# Patient Record
Sex: MALE | Race: WHITE
[De-identification: names, ages, dates, MRNs, and addresses within clinical notes are randomized per-mention and may not be internally consistent; named-entity substitution may affect disease eponyms.]

---

## 2022-12-03 ENCOUNTER — HOSPITAL ENCOUNTER (EMERGENCY)
Dept: HOSPITAL 47 - EC | Age: 37
LOS: 1 days | Discharge: HOME | End: 2022-12-04
Payer: COMMERCIAL

## 2022-12-03 VITALS — TEMPERATURE: 98.7 F

## 2022-12-03 VITALS — RESPIRATION RATE: 18 BRPM

## 2022-12-03 DIAGNOSIS — F41.9: ICD-10-CM

## 2022-12-03 DIAGNOSIS — F17.210: ICD-10-CM

## 2022-12-03 DIAGNOSIS — R06.02: Primary | ICD-10-CM

## 2022-12-03 LAB
ALBUMIN SERPL-MCNC: 5 G/DL (ref 3.5–5)
ALP SERPL-CCNC: 102 U/L (ref 38–126)
ALT SERPL-CCNC: 29 U/L (ref 4–49)
ANION GAP SERPL CALC-SCNC: 13 MMOL/L
APTT BLD: 25.2 SEC (ref 22–30)
AST SERPL-CCNC: 32 U/L (ref 17–59)
BASOPHILS # BLD AUTO: 0 K/UL (ref 0–0.2)
BASOPHILS NFR BLD AUTO: 0 %
BUN SERPL-SCNC: 16 MG/DL (ref 9–20)
CALCIUM SPEC-MCNC: 9.5 MG/DL (ref 8.4–10.2)
CHLORIDE SERPL-SCNC: 103 MMOL/L (ref 98–107)
CO2 SERPL-SCNC: 24 MMOL/L (ref 22–30)
EOSINOPHIL # BLD AUTO: 0.1 K/UL (ref 0–0.7)
EOSINOPHIL NFR BLD AUTO: 1 %
ERYTHROCYTE [DISTWIDTH] IN BLOOD BY AUTOMATED COUNT: 5.51 M/UL (ref 4.3–5.9)
ERYTHROCYTE [DISTWIDTH] IN BLOOD: 13.6 % (ref 11.5–15.5)
GLUCOSE SERPL-MCNC: 101 MG/DL (ref 74–99)
HCT VFR BLD AUTO: 44.9 % (ref 39–53)
HGB BLD-MCNC: 14.8 GM/DL (ref 13–17.5)
INR PPP: 0.9 (ref ?–1.2)
LYMPHOCYTES # SPEC AUTO: 1.3 K/UL (ref 1–4.8)
LYMPHOCYTES NFR SPEC AUTO: 10 %
MAGNESIUM SPEC-SCNC: 1.7 MG/DL (ref 1.6–2.3)
MCH RBC QN AUTO: 26.8 PG (ref 25–35)
MCHC RBC AUTO-ENTMCNC: 32.9 G/DL (ref 31–37)
MCV RBC AUTO: 81.5 FL (ref 80–100)
MONOCYTES # BLD AUTO: 0.6 K/UL (ref 0–1)
MONOCYTES NFR BLD AUTO: 5 %
NEUTROPHILS # BLD AUTO: 10.7 K/UL (ref 1.3–7.7)
NEUTROPHILS NFR BLD AUTO: 83 %
PLATELET # BLD AUTO: 205 K/UL (ref 150–450)
POTASSIUM SERPL-SCNC: 3.9 MMOL/L (ref 3.5–5.1)
PROT SERPL-MCNC: 7.7 G/DL (ref 6.3–8.2)
PT BLD: 10.1 SEC (ref 9–12)
SODIUM SERPL-SCNC: 140 MMOL/L (ref 137–145)
WBC # BLD AUTO: 12.8 K/UL (ref 3.8–10.6)

## 2022-12-03 PROCEDURE — 84443 ASSAY THYROID STIM HORMONE: CPT

## 2022-12-03 PROCEDURE — 71046 X-RAY EXAM CHEST 2 VIEWS: CPT

## 2022-12-03 PROCEDURE — 85610 PROTHROMBIN TIME: CPT

## 2022-12-03 PROCEDURE — 96361 HYDRATE IV INFUSION ADD-ON: CPT

## 2022-12-03 PROCEDURE — 96374 THER/PROPH/DIAG INJ IV PUSH: CPT

## 2022-12-03 PROCEDURE — 85379 FIBRIN DEGRADATION QUANT: CPT

## 2022-12-03 PROCEDURE — 84484 ASSAY OF TROPONIN QUANT: CPT

## 2022-12-03 PROCEDURE — 80053 COMPREHEN METABOLIC PANEL: CPT

## 2022-12-03 PROCEDURE — 83735 ASSAY OF MAGNESIUM: CPT

## 2022-12-03 PROCEDURE — 85025 COMPLETE CBC W/AUTO DIFF WBC: CPT

## 2022-12-03 PROCEDURE — 36415 COLL VENOUS BLD VENIPUNCTURE: CPT

## 2022-12-03 PROCEDURE — 93005 ELECTROCARDIOGRAM TRACING: CPT

## 2022-12-03 PROCEDURE — 99285 EMERGENCY DEPT VISIT HI MDM: CPT

## 2022-12-03 PROCEDURE — 83605 ASSAY OF LACTIC ACID: CPT

## 2022-12-03 PROCEDURE — 85730 THROMBOPLASTIN TIME PARTIAL: CPT

## 2022-12-03 NOTE — XR
EXAMINATION TYPE: XR chest 2V

 

DATE OF EXAM: 12/3/2022

 

COMPARISON: NONE

 

HISTORY: Short of breath

 

TECHNIQUE:

 

FINDINGS: Heart and mediastinum are normal. Lungs are clear. Diaphragm is normal. Bony thorax is norm
al.

 

IMPRESSION: Normal chest.

## 2022-12-03 NOTE — ED
SOB HPI





<Braxton Cid - Last Filed: 12/03/22 22:17>





- General


Source: patient, family, RN notes reviewed, old records reviewed


Mode of arrival: ambulatory


Limitations: no limitations





- History of Present Illness


MD Complaint: shortness of breath


-: hour(s)


Severity scale (1-10): 0


Consistency: constant


Improves With: nothing


Worsens With: nothing


Associated Symptoms: other (Shortness of breath, dizziness)





<Brady Mayes - Last Filed: 12/04/22 02:12>





- General


Chief Complaint: Shortness of Breath


Stated Complaint: YOUSIF, Dizzy


Time Seen by Provider: 12/03/22 21:10





- History of Present Illness


Initial Comments: 


This is an anxious appearing 37-year-old male that presents to the emergency 

room with sudden onset of shortness of breath and dizziness while having dinner 

tonight.  Patient denies any medical history.  No medications on a daily basis. 

Does smoke half pack cigarettes a day.  Family at bedside states that he 

recently lost his mom has been under a lot of stress. (Brady Mayes)





- Related Data


                                    Allergies











Allergy/AdvReac Type Severity Reaction Status Date / Time


 


No Known Allergies Allergy   Verified 12/03/22 20:54














Review of Systems


ROS Other: All systems not noted in ROS Statement are negative.





<Braxton Cid - Last Filed: 12/03/22 22:17>


ROS Other: All systems not noted in ROS Statement are negative.





<Brady Mayes - Last Filed: 12/04/22 02:12>


ROS Statement: 


Those systems with pertinent positive or pertinent negative responses have been 

documented in the HPI.








Past Medical History


Past Medical History: No Reported History


History of Any Multi-Drug Resistant Organisms: None Reported


Past Surgical History: No Surgical Hx Reported


Past Psychological History: No Psychological Hx Reported


Smoking Status: Current every day smoker


Past Alcohol Use History: Occasional


Past Drug Use History: None Reported





<Brady Mayes - Last Filed: 12/04/22 02:12>





General Exam


Limitations: no limitations


General appearance: alert, anxious


Head exam: Present: atraumatic, normocephalic


Eye exam: Present: normal appearance.  Absent: scleral icterus, conjunctival 

injection, periorbital swelling


ENT exam: Present: normal oropharynx, mucous membranes moist


  ** Expanded


Mouth exam: Present: normal external inspection, tongue normal, tongue 

elevation.  Absent: drooling, trismus, muffled voice


Throat exam: normal inspection.  negative: tonsillar erythema, tonsillomegaly, 

tonsillar exudate, R peritonsillar mass, L peritonsillar mass


Neck exam: Present: full ROM.  Absent: tenderness, meningismus, lymphadenopathy,

thyromegaly


Respiratory exam: Present: normal lung sounds bilaterally, other (Tachypnea).  

Absent: respiratory distress, wheezes, rales, rhonchi, stridor, chest wall 

tenderness, accessory muscle use, decreased breath sounds


Cardiovascular Exam: Present: tachycardia


GI/Abdominal exam: Present: soft.  Absent: distended, tenderness, rigid


Extremities exam: Present: full ROM, normal capillary refill.  Absent: 

tenderness, pedal edema


Back exam: Present: full ROM.  Absent: tenderness, CVA tenderness (R), CVA 

tenderness (L), rash noted


Neurological exam: Present: alert, oriented X3, normal gait


Psychiatric exam: Present: anxious


Skin exam: Present: warm, dry, normal color.  Absent: cyanosis, diaphoretic, 

petechiae, pallor





<Brady Mayes - Last Filed: 12/04/22 02:12>





Course


                                   Vital Signs











  12/03/22 12/03/22 12/03/22





  20:51 22:21 22:30


 


Temperature 98.7 F  


 


Pulse Rate 131 H  112 H


 


Respiratory 28 H 24 18





Rate   


 


Blood Pressure 134/80  135/89


 


O2 Sat by Pulse 99  98





Oximetry   














  12/04/22





  00:00


 


Temperature 


 


Pulse Rate 99


 


Respiratory 18





Rate 


 


Blood Pressure 132/76


 


O2 Sat by Pulse 98





Oximetry 














Medical Decision Making





- Lab Data


Result diagrams: 


                                 12/03/22 22:01








- EKG Data


-: EKG Interpreted by Me (EKG is sinus tachycardia 143  QRS 90 )





<Braxton Cid - Last Filed: 12/03/22 22:17>





- Lab Data


Result diagrams: 


                                 12/03/22 22:01





                                 12/03/22 22:01





- EKG Data


-: EKG Interpreted by Me


EKG shows normal: sinus rhythm


Rate: tachycardia (140, OK interval 0.118, QRS 0.86, QTC 0.381 normal axis and 

no ST elevation)





<Brady Mayes - Last Filed: 12/04/22 02:12>





- Medical Decision Making


Patient presents with sudden onset of shortness of breath and dizziness while 

eating dinner.  


Upon arrival patient's oxygen saturation 99% on room air is heart rate is 131.  

He denies any chest pain.  No nausea vomiting diarrhea or fevers.


Patient has no medical history, no medicines on a daily basis.  


EKG shows sinus tachycardia ventricular rate 140.  Normal intervals.  Normal 

axis.  Troponin is negative at 0.012.  Labs show mild leukocytosis.  D-dimer is 

negative.  Electrolytes unremarkable. Lactic acid 3.4 which is likely related to

patient's hyperventilation.  


Chest x-ray interpreted by me shows no evidence of consolidation.  Cardiac 

silhouette normal size.





Radiologist interpretation normal chest.


He has no abdominal pain.  No fevers.


He was given Ativan with relief of his symptoms.  Vital signs are now stable.  

We did discuss the possibility this may be a stress reaction versus anxiety. 

Patient and family state he has been under a lot of stress with family dynamics,

the death of his mother and court proceedings.  


He will be directed to follow up with his primary care doctor nexxt week and 

return to the emergency room with any new or concerning symptoms.  


He and family are agreeable to this plan of care.





Case discussed with Dr. Cid ([facility])





- Lab Data


                                   Lab Results











  12/03/22 12/03/22 12/03/22 Range/Units





  22:01 22:01 22:01 


 


WBC  12.8 H    (3.8-10.6)  k/uL


 


RBC  5.51    (4.30-5.90)  m/uL


 


Hgb  14.8    (13.0-17.5)  gm/dL


 


Hct  44.9    (39.0-53.0)  %


 


MCV  81.5    (80.0-100.0)  fL


 


MCH  26.8    (25.0-35.0)  pg


 


MCHC  32.9    (31.0-37.0)  g/dL


 


RDW  13.6    (11.5-15.5)  %


 


Plt Count  205    (150-450)  k/uL


 


MPV  9.0    


 


Neutrophils %  83    %


 


Lymphocytes %  10    %


 


Monocytes %  5    %


 


Eosinophils %  1    %


 


Basophils %  0    %


 


Neutrophils #  10.7 H    (1.3-7.7)  k/uL


 


Lymphocytes #  1.3    (1.0-4.8)  k/uL


 


Monocytes #  0.6    (0-1.0)  k/uL


 


Eosinophils #  0.1    (0-0.7)  k/uL


 


Basophils #  0.0    (0-0.2)  k/uL


 


PT   10.1   (9.0-12.0)  sec


 


INR   0.9   (<1.2)  


 


APTT   25.2   (22.0-30.0)  sec


 


D-Dimer   0.19   (<0.60)  mg/L FEU


 


Sodium    140  (137-145)  mmol/L


 


Potassium    3.9  (3.5-5.1)  mmol/L


 


Chloride    103  ()  mmol/L


 


Carbon Dioxide    24  (22-30)  mmol/L


 


Anion Gap    13  mmol/L


 


BUN    16  (9-20)  mg/dL


 


Creatinine    1.07  (0.66-1.25)  mg/dL


 


Est GFR (CKD-EPI)AfAm    >90  (>60 ml/min/1.73 sqM)  


 


Est GFR (CKD-EPI)NonAf    >90  (>60 ml/min/1.73 sqM)  


 


Glucose    101 H  (74-99)  mg/dL


 


Lactic Ac Sepsis Rflx     


 


Plasma Lactic Acid Vineet     (0.7-2.0)  mmol/L


 


Calcium    9.5  (8.4-10.2)  mg/dL


 


Magnesium    1.7  (1.6-2.3)  mg/dL


 


Total Bilirubin    0.5  (0.2-1.3)  mg/dL


 


AST    32  (17-59)  U/L


 


ALT    29  (4-49)  U/L


 


Alkaline Phosphatase    102  ()  U/L


 


Troponin I     (0.000-0.034)  ng/mL


 


Total Protein    7.7  (6.3-8.2)  g/dL


 


Albumin    5.0  (3.5-5.0)  g/dL


 


TSH     (0.465-4.680)  mIU/L














  12/03/22 12/03/22 12/03/22 Range/Units





  22:01 22:01 22:01 


 


WBC     (3.8-10.6)  k/uL


 


RBC     (4.30-5.90)  m/uL


 


Hgb     (13.0-17.5)  gm/dL


 


Hct     (39.0-53.0)  %


 


MCV     (80.0-100.0)  fL


 


MCH     (25.0-35.0)  pg


 


MCHC     (31.0-37.0)  g/dL


 


RDW     (11.5-15.5)  %


 


Plt Count     (150-450)  k/uL


 


MPV     


 


Neutrophils %     %


 


Lymphocytes %     %


 


Monocytes %     %


 


Eosinophils %     %


 


Basophils %     %


 


Neutrophils #     (1.3-7.7)  k/uL


 


Lymphocytes #     (1.0-4.8)  k/uL


 


Monocytes #     (0-1.0)  k/uL


 


Eosinophils #     (0-0.7)  k/uL


 


Basophils #     (0-0.2)  k/uL


 


PT     (9.0-12.0)  sec


 


INR     (<1.2)  


 


APTT     (22.0-30.0)  sec


 


D-Dimer     (<0.60)  mg/L FEU


 


Sodium     (137-145)  mmol/L


 


Potassium     (3.5-5.1)  mmol/L


 


Chloride     ()  mmol/L


 


Carbon Dioxide     (22-30)  mmol/L


 


Anion Gap     mmol/L


 


BUN     (9-20)  mg/dL


 


Creatinine     (0.66-1.25)  mg/dL


 


Est GFR (CKD-EPI)AfAm     (>60 ml/min/1.73 sqM)  


 


Est GFR (CKD-EPI)NonAf     (>60 ml/min/1.73 sqM)  


 


Glucose     (74-99)  mg/dL


 


Lactic Ac Sepsis Rflx     


 


Plasma Lactic Acid Vineet  3.4 H*    (0.7-2.0)  mmol/L


 


Calcium     (8.4-10.2)  mg/dL


 


Magnesium     (1.6-2.3)  mg/dL


 


Total Bilirubin     (0.2-1.3)  mg/dL


 


AST     (17-59)  U/L


 


ALT     (4-49)  U/L


 


Alkaline Phosphatase     ()  U/L


 


Troponin I   <0.012   (0.000-0.034)  ng/mL


 


Total Protein     (6.3-8.2)  g/dL


 


Albumin     (3.5-5.0)  g/dL


 


TSH    0.862  (0.465-4.680)  mIU/L














  12/03/22 Range/Units





  22:37 


 


WBC   (3.8-10.6)  k/uL


 


RBC   (4.30-5.90)  m/uL


 


Hgb   (13.0-17.5)  gm/dL


 


Hct   (39.0-53.0)  %


 


MCV   (80.0-100.0)  fL


 


MCH   (25.0-35.0)  pg


 


MCHC   (31.0-37.0)  g/dL


 


RDW   (11.5-15.5)  %


 


Plt Count   (150-450)  k/uL


 


MPV   


 


Neutrophils %   %


 


Lymphocytes %   %


 


Monocytes %   %


 


Eosinophils %   %


 


Basophils %   %


 


Neutrophils #   (1.3-7.7)  k/uL


 


Lymphocytes #   (1.0-4.8)  k/uL


 


Monocytes #   (0-1.0)  k/uL


 


Eosinophils #   (0-0.7)  k/uL


 


Basophils #   (0-0.2)  k/uL


 


PT   (9.0-12.0)  sec


 


INR   (<1.2)  


 


APTT   (22.0-30.0)  sec


 


D-Dimer   (<0.60)  mg/L FEU


 


Sodium   (137-145)  mmol/L


 


Potassium   (3.5-5.1)  mmol/L


 


Chloride   ()  mmol/L


 


Carbon Dioxide   (22-30)  mmol/L


 


Anion Gap   mmol/L


 


BUN   (9-20)  mg/dL


 


Creatinine   (0.66-1.25)  mg/dL


 


Est GFR (CKD-EPI)AfAm   (>60 ml/min/1.73 sqM)  


 


Est GFR (CKD-EPI)NonAf   (>60 ml/min/1.73 sqM)  


 


Glucose   (74-99)  mg/dL


 


Lactic Ac Sepsis Rflx  Y  


 


Plasma Lactic Acid Vineet   (0.7-2.0)  mmol/L


 


Calcium   (8.4-10.2)  mg/dL


 


Magnesium   (1.6-2.3)  mg/dL


 


Total Bilirubin   (0.2-1.3)  mg/dL


 


AST   (17-59)  U/L


 


ALT   (4-49)  U/L


 


Alkaline Phosphatase   ()  U/L


 


Troponin I   (0.000-0.034)  ng/mL


 


Total Protein   (6.3-8.2)  g/dL


 


Albumin   (3.5-5.0)  g/dL


 


TSH   (0.465-4.680)  mIU/L














Disposition





<Braxton Cid - Last Filed: 12/03/22 22:17>


Is patient prescribed a controlled substance at d/c from ED?: No


Time of Disposition: 23:14





<Brady Mayes - Last Filed: 12/04/22 02:12>


Clinical Impression: 


 Acute anxiety, Difficulty breathing





Disposition: HOME SELF-CARE


Condition: Good


Instructions (If sedation given, give patient instructions):  Anxiety (ED), 

Shortness of Breath (ED)


Additional Instructions: 


Increase your fluid intake.  Follow-up with your primary care doctor next week. 

Return to the emergency room with any new or concerning symptoms.


Referrals: 


None,Stated [Primary Care Provider] - 1-2 days

## 2022-12-04 VITALS — HEART RATE: 99 BPM | DIASTOLIC BLOOD PRESSURE: 76 MMHG | SYSTOLIC BLOOD PRESSURE: 132 MMHG
